# Patient Record
Sex: MALE | Race: OTHER
[De-identification: names, ages, dates, MRNs, and addresses within clinical notes are randomized per-mention and may not be internally consistent; named-entity substitution may affect disease eponyms.]

---

## 2019-10-01 ENCOUNTER — HOSPITAL ENCOUNTER (EMERGENCY)
Dept: HOSPITAL 62 - ER | Age: 54
Discharge: HOME | End: 2019-10-01
Payer: COMMERCIAL

## 2019-10-01 VITALS — DIASTOLIC BLOOD PRESSURE: 95 MMHG | SYSTOLIC BLOOD PRESSURE: 154 MMHG

## 2019-10-01 DIAGNOSIS — I83.812: Primary | ICD-10-CM

## 2019-10-01 DIAGNOSIS — F17.200: ICD-10-CM

## 2019-10-01 LAB
ADD MANUAL DIFF: NO
ALBUMIN SERPL-MCNC: 4.2 G/DL (ref 3.5–5)
ALP SERPL-CCNC: 64 U/L (ref 38–126)
ANION GAP SERPL CALC-SCNC: 8 MMOL/L (ref 5–19)
APPEARANCE UR: CLEAR
APTT BLD: 27.4 SEC (ref 23.5–35.8)
APTT PPP: YELLOW S
AST SERPL-CCNC: 30 U/L (ref 17–59)
BASOPHILS # BLD AUTO: 0.1 10^3/UL (ref 0–0.2)
BASOPHILS NFR BLD AUTO: 0.9 % (ref 0–2)
BILIRUB DIRECT SERPL-MCNC: 0.1 MG/DL (ref 0–0.4)
BILIRUB SERPL-MCNC: 0.7 MG/DL (ref 0.2–1.3)
BILIRUB UR QL STRIP: NEGATIVE
BUN SERPL-MCNC: 16 MG/DL (ref 7–20)
CALCIUM: 9.4 MG/DL (ref 8.4–10.2)
CHLORIDE SERPL-SCNC: 105 MMOL/L (ref 98–107)
CK SERPL-CCNC: 293 U/L (ref 55–170)
CO2 SERPL-SCNC: 26 MMOL/L (ref 22–30)
EOSINOPHIL # BLD AUTO: 0.2 10^3/UL (ref 0–0.6)
EOSINOPHIL NFR BLD AUTO: 2.4 % (ref 0–6)
ERYTHROCYTE [DISTWIDTH] IN BLOOD BY AUTOMATED COUNT: 13.7 % (ref 11.5–14)
GLUCOSE SERPL-MCNC: 89 MG/DL (ref 75–110)
GLUCOSE UR STRIP-MCNC: NEGATIVE MG/DL
HCT VFR BLD CALC: 42.6 % (ref 37.9–51)
HGB BLD-MCNC: 15 G/DL (ref 13.5–17)
INR PPP: 1
KETONES UR STRIP-MCNC: NEGATIVE MG/DL
LYMPHOCYTES # BLD AUTO: 2 10^3/UL (ref 0.5–4.7)
LYMPHOCYTES NFR BLD AUTO: 30.4 % (ref 13–45)
MCH RBC QN AUTO: 29.8 PG (ref 27–33.4)
MCHC RBC AUTO-ENTMCNC: 35.1 G/DL (ref 32–36)
MCV RBC AUTO: 85 FL (ref 80–97)
MONOCYTES # BLD AUTO: 0.4 10^3/UL (ref 0.1–1.4)
MONOCYTES NFR BLD AUTO: 6.3 % (ref 3–13)
NEUTROPHILS # BLD AUTO: 4 10^3/UL (ref 1.7–8.2)
NEUTS SEG NFR BLD AUTO: 60 % (ref 42–78)
NITRITE UR QL STRIP: NEGATIVE
PH UR STRIP: 6 [PH] (ref 5–9)
PLATELET # BLD: 259 10^3/UL (ref 150–450)
POTASSIUM SERPL-SCNC: 4.1 MMOL/L (ref 3.6–5)
PROT SERPL-MCNC: 6.7 G/DL (ref 6.3–8.2)
PROT UR STRIP-MCNC: NEGATIVE MG/DL
PROTHROMBIN TIME: 13.2 SEC (ref 11.4–15.4)
RBC # BLD AUTO: 5.03 10^6/UL (ref 4.35–5.55)
SP GR UR STRIP: 1.02
TOTAL CELLS COUNTED % (AUTO): 100 %
UROBILINOGEN UR-MCNC: 4 MG/DL (ref ?–2)
WBC # BLD AUTO: 6.6 10^3/UL (ref 4–10.5)

## 2019-10-01 PROCEDURE — 36415 COLL VENOUS BLD VENIPUNCTURE: CPT

## 2019-10-01 PROCEDURE — 82550 ASSAY OF CK (CPK): CPT

## 2019-10-01 PROCEDURE — 81001 URINALYSIS AUTO W/SCOPE: CPT

## 2019-10-01 PROCEDURE — 93971 EXTREMITY STUDY: CPT

## 2019-10-01 PROCEDURE — 85610 PROTHROMBIN TIME: CPT

## 2019-10-01 PROCEDURE — 85730 THROMBOPLASTIN TIME PARTIAL: CPT

## 2019-10-01 PROCEDURE — 85025 COMPLETE CBC W/AUTO DIFF WBC: CPT

## 2019-10-01 PROCEDURE — 80053 COMPREHEN METABOLIC PANEL: CPT

## 2019-10-01 NOTE — RADIOLOGY REPORT (SQ)
EXAM DESCRIPTION:  VENOUS UNILATERAL LOWER



COMPLETED DATE/TIME:  10/1/2019 2:07 pm



REASON FOR STUDY:  Left lower leg swelling pain at times



COMPARISON:  None.



TECHNIQUE:  Dynamic and static gray scale and color images acquired of the left leg venous system. Se
lected spectral images acquired with additional compression and augmentation maneuvers. The contralat
eral common femoral vein and saphenofemoral junction were also imaged. Images stored on PACS.



LIMITATIONS:  None.



FINDINGS:  COMMON FEMORAL: Normal phasicity, compression and augmentation. No visualized echogenic ma
terial on gray scale. No defects on color images.

FEMORAL: Normal compression and augmentation. No visualized echogenic material on gray scale. No defe
cts on color images.

POPLITEAL: Normal compression, augmentation. No visualized echogenic material on gray scale. No defec
ts on color images.

CALF VESSELS: Normal compression, augmentation. No visualized echogenic material on gray scale. No de
fects on color images.

GSV and SSV: Normal compression, augmentation. No visualized echogenic material on gray scale. No def
ects on color images.

ANY DEEP VENOUS INSUFFICIENCY: Not evaluated.

ANY EVIDENCE OF POPLITEAL CYST: No.

OTHER: Thrombosed superficial varicosities in the subcutaneous tissues of the medial calf in the clin
ically symptomatic area.

CONTRALATERAL COMMON FEMORAL VEIN AND SAPHENOFEMORAL JUNCTION:

Normal phasicity, compression and augmentation. No visualized echogenic material on gray scale. No de
fects on color images.



IMPRESSION:  NO EVIDENCE DVT OR SVT IN THE LEFT LEG.

THERE ARE THROMBOSED SUPERFICIAL VARICOSITIES IN THE SUBCUTANEOUS TISSUES OF THE MEDIAL CALF IN THE A
MARIFER OF CONCERN.



COMMENT:   Preliminary report was called by the technologist to the referring clinician's office at t
he time of the exam.



TECHNICAL DOCUMENTATION:  JOB ID:  [id number]

 2011 [employer]- All Rights Reserved



Reading location - IP/workstation name: [email or web address]

## 2019-10-01 NOTE — ER DOCUMENT REPORT
ED Extremity Problem, Lower





- General


Chief Complaint: Leg Swelling


Stated Complaint: LEFT LEG PAIN


Time Seen by Provider: 10/01/19 11:07


Primary Care Provider: 


JERICA YEN MD [Primary Care Provider] - Follow up as needed


Mode of Arrival: Wheelchair


TRAVEL OUTSIDE OF THE [place] IN LAST 30 DAYS: Yes





- HPI


Notes: 





This is a 54-year-old gentleman who presents with a complaint of left lower 

extremity swelling and pain for the past couple of days.  Patient states that he

recently came from a trip to [place] and it was a long drive.  He denies any 

cardiopulmonary symptoms.  He denies any trauma.  He describes the symptoms as 

mild.  There are no obvious aggravating or relieving factors.  He denies any 

trauma.





- Related Data


Allergies/Adverse Reactions: 


                                        





No Known Allergies Allergy (Unverified 10/01/19 11:04)


   











Past Medical History





- General


Information source: Patient





- Social History


Smoking Status: Current Every Day Smoker


Frequency of alcohol use: Rare


Drug Abuse: None


Family History: Reviewed & Not Pertinent


Patient has suicidal ideation: No


Patient has homicidal ideation: No





Review of Systems





- Review of Systems


Cardiovascular: denies: Chest pain, Palpitations, Heart racing


Respiratory: denies: Cough


Gastrointestinal: denies: Abdominal pain


Musculoskeletal: Muscle pain, Leg swelling


-: Yes All other systems reviewed and negative





Physical Exam





- Vital signs


Vitals: 


                                        











Temp Pulse Resp BP Pulse Ox


 


 98.2 F   69   16   148/88 H  100 


 


 10/01/19 10:46  10/01/19 10:46  10/01/19 10:46  10/01/19 10:46  10/01/19 10:46














- General


General appearance: Appears well, Alert





- Respiratory


Respiratory status: No respiratory distress


Chest status: Nontender


Breath sounds: Normal


Chest palpation: Normal





- Cardiovascular


Rhythm: Regular


Heart sounds: Normal auscultation


Murmur: No





- Abdominal


Inspection: Normal


Distension: No distension


Bowel sounds: Normal


Tenderness: Nontender


Organomegaly: No organomegaly





- Extremities


General upper extremity: Normal inspection, Nontender, Normal color, Normal ROM,

Normal temperature


General lower extremity: Normal inspection, Nontender - Do not appreciate any 

significant swelling or tenderness of the left lower extremity.  No calf 

tenderness.  Negative Homans sign.  Normal distal neurovascular exam of the left

lower extremity., Normal color, Normal ROM, Normal temperature, Normal weight 

bearing.  No: Shae's sign





- Neurological


Neuro grossly intact: Yes


Cognition: Normal


Orientation: AAOx4


Congers Coma Scale Eye Opening: Spontaneous


Congers Coma Scale Verbal: Oriented


Yoon Coma Scale Motor: Obeys Commands


Yoon Coma Scale Total: 15


Speech: Normal


Motor strength normal: LUE, RUE, LLE, RLE


Sensory: Normal





- Skin


Skin Temperature: Warm


Skin Moisture: Dry


Skin Color: Normal





Course





- Re-evaluation


Re-evalutation: 





10/01/19 15:08


Differential diagnosis includes DVT versus superficial, phlebitis versus 

varicosity versus muscle skeletal strain.


10/01/19 17:10


Patient is doing well.  Labs and imaging reviewed and discussed with patient and

family.  Patient stable for discharge.





- Vital Signs


Vital signs: 


                                        











Temp Pulse Resp BP Pulse Ox


 


 97.7 F   51 L  16   154/95 H  99 


 


 10/01/19 15:22  10/01/19 15:22  10/01/19 10:46  10/01/19 15:22  10/01/19 15:22














- Laboratory


Result Diagrams: 


                                 10/01/19 12:05





                                 10/01/19 12:05


Laboratory results interpreted by me: 


                                        











  10/01/19 10/01/19





  12:05 12:10


 


Creatine Kinase  293 H 


 


Urine Urobilinogen   4.0 H














Discharge





- Discharge


Clinical Impression: 


 Leg pain, left





Varicose vein of leg


Qualifiers:


 Varicose vein complication: unspecified Laterality: left Qualified Code(s): 

I83.92 - Asymptomatic varicose veins of left lower extremity





Condition: Good


Disposition: HOME, SELF-CARE


Instructions:  Leg Pain Nonspecific ([facility]), Varicose Veins ([facility])


Prescriptions: 


RX: Naproxen 500 mg PO BID PRN #14 tablet


 PRN Reason: 


Referrals: 


JERICA YEN MD [Primary Care Provider] - Follow up as needed

## 2019-10-01 NOTE — ER DOCUMENT REPORT
ED Medical Screen (RME)





- General


Chief Complaint: Leg Swelling


Stated Complaint: LEFT LEG PAIN


Time Seen by Provider: 10/01/19 11:07


Mode of Arrival: Wheelchair


Information source: Patient


Notes: 





54-year-old male presented to ED for complaint of left lower leg swelling and 

pain.  He states that this moment is not hurting but at times it does hurt.  He 

states it is been swelling for a while.  He states he went on long trip and it 

did get worse.  He states he does smoke 1/2 pack a day and has had a history of 

a DVT that they started him on aspirin for but there is no other treatment for i

t.  He said is been several years ago.  He is alert oriented respirations 

regular and unlabored speaking in full sentences walks with a even steady gait.




















I have greeted and performed a rapid initial assessment of this patient.  A 

comprehensive ED assessment and evaluation of the patient, analysis of test 

results and completion of medical decision making process will be conducted by 

an additional ED providers.


TRAVEL OUTSIDE OF THE [place] IN LAST 30 DAYS: Yes





- Related Data


Allergies/Adverse Reactions: 


                                        





No Known Allergies Allergy (Unverified 10/01/19 11:04)


   











Physical Exam





- Vital signs


Vitals: 





                                        











Temp Pulse Resp BP Pulse Ox


 


 98.2 F   69   16   148/88 H  100 


 


 10/01/19 10:46  10/01/19 10:46  10/01/19 10:46  10/01/19 10:46  10/01/19 10:46














Course





- Vital Signs


Vital signs: 





                                        











Temp Pulse Resp BP Pulse Ox


 


 98.2 F   69   16   148/88 H  100 


 


 10/01/19 10:46  10/01/19 10:46  10/01/19 10:46  10/01/19 10:46  10/01/19 10:46